# Patient Record
Sex: MALE | Race: WHITE | Employment: FULL TIME | URBAN - METROPOLITAN AREA
[De-identification: names, ages, dates, MRNs, and addresses within clinical notes are randomized per-mention and may not be internally consistent; named-entity substitution may affect disease eponyms.]

---

## 2019-06-16 ENCOUNTER — HOSPITAL ENCOUNTER (EMERGENCY)
Facility: CLINIC | Age: 54
Discharge: HOME | End: 2019-06-17
Attending: EMERGENCY MEDICINE | Admitting: EMERGENCY MEDICINE
Payer: COMMERCIAL

## 2019-06-16 DIAGNOSIS — R07.9 CHEST PAIN, UNSPECIFIED TYPE: ICD-10-CM

## 2019-06-16 DIAGNOSIS — R00.2 PALPITATIONS: ICD-10-CM

## 2019-06-16 DIAGNOSIS — R19.7 DIARRHEA, UNSPECIFIED TYPE: ICD-10-CM

## 2019-06-16 PROCEDURE — 93005 ELECTROCARDIOGRAM TRACING: CPT

## 2019-06-16 PROCEDURE — 96374 THER/PROPH/DIAG INJ IV PUSH: CPT

## 2019-06-16 PROCEDURE — 96361 HYDRATE IV INFUSION ADD-ON: CPT

## 2019-06-16 PROCEDURE — 99284 EMERGENCY DEPT VISIT MOD MDM: CPT | Mod: 25

## 2019-06-16 NOTE — ED AVS SNAPSHOT
[facility] Emergency Department  [address]  [facility] [address]  Phone:  [phone]  Fax:  [phone]                                    Ward Cline   MRN: [id number]    Department:  [facility] Emergency Department   Date of Visit:  6/16/2019           After Visit Summary Signature Page    I have received my discharge instructions, and my questions have been answered. I have discussed any challenges I see with this plan with the nurse or doctor.    ..........................................................................................................................................  Patient/Patient Representative Signature      ..........................................................................................................................................  Patient Representative Print Name and Relationship to Patient    ..................................................               ................................................  Date                                   Time    ..........................................................................................................................................  Reviewed by Signature/Title    ...................................................              ..............................................  Date                                               Time          22EPIC Rev 08/18

## 2019-06-17 VITALS
RESPIRATION RATE: 12 BRPM | HEART RATE: 87 BPM | DIASTOLIC BLOOD PRESSURE: 71 MMHG | TEMPERATURE: 98.2 F | OXYGEN SATURATION: 96 % | SYSTOLIC BLOOD PRESSURE: 126 MMHG

## 2019-06-17 LAB
ALBUMIN SERPL-MCNC: 4.1 G/DL (ref 3.4–5)
ALP SERPL-CCNC: 61 U/L (ref 40–150)
ALT SERPL W P-5'-P-CCNC: 34 U/L (ref 0–70)
ANION GAP SERPL CALCULATED.3IONS-SCNC: 8 MMOL/L (ref 3–14)
AST SERPL W P-5'-P-CCNC: 41 U/L (ref 0–45)
BILIRUB SERPL-MCNC: 1.6 MG/DL (ref 0.2–1.3)
BUN SERPL-MCNC: 18 MG/DL (ref 7–30)
CALCIUM SERPL-MCNC: 8.7 MG/DL (ref 8.5–10.1)
CHLORIDE SERPL-SCNC: 102 MMOL/L (ref 94–109)
CO2 SERPL-SCNC: 25 MMOL/L (ref 20–32)
CREAT SERPL-MCNC: 0.84 MG/DL (ref 0.66–1.25)
ERYTHROCYTE [DISTWIDTH] IN BLOOD BY AUTOMATED COUNT: 12.5 % (ref 10–15)
GFR SERPL CREATININE-BSD FRML MDRD: >90 ML/MIN/{1.73_M2}
GLUCOSE SERPL-MCNC: 109 MG/DL (ref 70–99)
HCT VFR BLD AUTO: 44.2 % (ref 40–53)
HGB BLD-MCNC: 14.9 G/DL (ref 13.3–17.7)
INTERPRETATION ECG - MUSE: NORMAL
LIPASE SERPL-CCNC: 125 U/L (ref 73–393)
MCH RBC QN AUTO: 30.7 PG (ref 26.5–33)
MCHC RBC AUTO-ENTMCNC: 33.7 G/DL (ref 31.5–36.5)
MCV RBC AUTO: 91 FL (ref 78–100)
PLATELET # BLD AUTO: 220 10E9/L (ref 150–450)
POTASSIUM SERPL-SCNC: 3.7 MMOL/L (ref 3.4–5.3)
PROT SERPL-MCNC: 7.4 G/DL (ref 6.8–8.8)
RBC # BLD AUTO: 4.85 10E12/L (ref 4.4–5.9)
SODIUM SERPL-SCNC: 135 MMOL/L (ref 133–144)
TROPONIN I SERPL-MCNC: <0.015 UG/L (ref 0–0.04)
WBC # BLD AUTO: 7.4 10E9/L (ref 4–11)

## 2019-06-17 PROCEDURE — 96374 THER/PROPH/DIAG INJ IV PUSH: CPT

## 2019-06-17 PROCEDURE — 25000125 ZZHC RX 250: Performed by: EMERGENCY MEDICINE

## 2019-06-17 PROCEDURE — 25000128 H RX IP 250 OP 636: Performed by: EMERGENCY MEDICINE

## 2019-06-17 PROCEDURE — 85027 COMPLETE CBC AUTOMATED: CPT | Performed by: EMERGENCY MEDICINE

## 2019-06-17 PROCEDURE — 84484 ASSAY OF TROPONIN QUANT: CPT | Performed by: EMERGENCY MEDICINE

## 2019-06-17 PROCEDURE — 80053 COMPREHEN METABOLIC PANEL: CPT | Performed by: EMERGENCY MEDICINE

## 2019-06-17 PROCEDURE — 83690 ASSAY OF LIPASE: CPT | Performed by: EMERGENCY MEDICINE

## 2019-06-17 PROCEDURE — 25000132 ZZH RX MED GY IP 250 OP 250 PS 637: Performed by: EMERGENCY MEDICINE

## 2019-06-17 RX ORDER — SODIUM CHLORIDE 9 MG/ML
1000 INJECTION, SOLUTION INTRAVENOUS CONTINUOUS
Status: DISCONTINUED | OUTPATIENT
Start: 2019-06-17 | End: 2019-06-17 | Stop reason: HOSPADM

## 2019-06-17 RX ORDER — ONDANSETRON 2 MG/ML
4 INJECTION INTRAMUSCULAR; INTRAVENOUS ONCE
Status: COMPLETED | OUTPATIENT
Start: 2019-06-17 | End: 2019-06-17

## 2019-06-17 RX ORDER — ONDANSETRON 4 MG/1
4 TABLET, ORALLY DISINTEGRATING ORAL EVERY 8 HOURS PRN
Qty: 10 TABLET | Refills: 0 | Status: SHIPPED | OUTPATIENT
Start: 2019-06-17 | End: 2019-06-20

## 2019-06-17 RX ADMIN — ONDANSETRON HYDROCHLORIDE 4 MG: 2 INJECTION, SOLUTION INTRAMUSCULAR; INTRAVENOUS at 00:42

## 2019-06-17 RX ADMIN — RANITIDINE 150 MG: 150 TABLET ORAL at 00:45

## 2019-06-17 RX ADMIN — SODIUM CHLORIDE 1000 ML: 9 INJECTION, SOLUTION INTRAVENOUS at 00:42

## 2019-06-17 RX ADMIN — LIDOCAINE HYDROCHLORIDE 30 ML: 20 SOLUTION ORAL; TOPICAL at 00:46

## 2019-06-17 ASSESSMENT — ENCOUNTER SYMPTOMS
CHILLS: 1
DIARRHEA: 1
PALPITATIONS: 1
ABDOMINAL PAIN: 1

## 2019-06-17 NOTE — DISCHARGE INSTRUCTIONS
Discharge Instructions  Chest Pain    You have been seen today for chest pain or discomfort.  At this time, your provider has found no signs that your chest pain is due to a serious or life-threatening condition, (or you have declined more testing and/or admission to the hospital). However, sometimes there is a serious problem that does not show up right away. Your evaluation today may not be complete and you may need further testing and evaluation.     Generally, every Emergency Department visit should have a follow-up clinic visit with either a primary or a specialty clinic/provider. Please follow-up as instructed by your emergency provider today.  Return to the Emergency Department if:  Your chest pain changes, gets worse, starts to happen more often, or comes with less activity.  You are newly short of breath.  You get very weak or tired.  You pass out or faint.  You have any new symptoms, like fever, cough, numb legs, or you cough up blood.  You have anything else that worries you.    Until you follow-up with your regular provider, please do the following:  If you have questions, contact your regular provider.  Follow-up with your regular provider/clinic as directed; this is very important.    If you were given a prescription for medicine here today, be sure to read all of the information (including the package insert) that comes with your prescription.  This will include important information about the medicine, its side effects, and any warnings that you need to know about.  The pharmacist who fills the prescription can provide more information and answer questions you may have about the medicine.  If you have questions or concerns that the pharmacist cannot address, please call or return to the Emergency Department.       Remember that you can always come back to the Emergency Department if you are not able to see your regular provider in the amount of time listed above, if you get any new symptoms, or if  there is anything that worries you.    Discharge Instructions  Abdominal Pain    Abdominal pain (belly pain) can be caused by many things. Your evaluation today does not show the exact cause for your pain. Your provider today has decided that it is unlikely your pain is due to a life threatening problem, or a problem requiring surgery or hospital admission. Sometimes those problems cannot be found right away, so it is very important that you follow up as directed.  Sometimes only the changes which occur over time allow the cause of your pain to be found.    Generally, every Emergency Department visit should have a follow-up clinic visit with either a primary or a specialty clinic/provider. Please follow-up as instructed by your emergency provider today. With abdominal pain, we often recommend very close follow-up, such as the following day.    ADULTS:  Return to the Emergency Department right away if:    You get an oral temperature above 102oF or as directed by your provider.  You have blood in your stools. This may be bright red or appear as black, tarry stools.    You keep vomiting (throwing up) or cannot drink liquids.  You see blood when you vomit.   You cannot have a bowel movement or you cannot pass gas.  Your stomach gets bloated or bigger.  Your skin or the whites of your eyes look yellow.  You faint.  You have bloody, frequent or painful urination (peeing).  You have new symptoms or anything that worries you.    CHILDREN:  Return to the Emergency Department right away if your child has any of the above-listed symptoms or the following:    Pushes your hand away or screams/cries when his/her belly is touched.  You notice your child is very fussy or weak.  Your child is very tired and is too tired to eat or drink.  Your child is dehydrated.  Signs of dehydration can be:  Significant change in the amount of wet diapers/urine.  Your infant or child starts to have dry mouth and lips, or no saliva (spit) or  tears.    PREGNANT WOMEN:  Return to the Emergency Department right away if you have any of the above-listed symptoms or the following:    You have bleeding, leaking fluid or passing tissue from the vagina.  You have worse pain or cramping, or pain in your shoulder or back.  You have vomiting that will not stop.  You have a temperature of 100oF or more.  Your baby is not moving as much as usual.  You faint.  You get a bad headache with or without eye problems and abdominal pain.  You have a seizure.  You have unusual discharge from your vagina and abdominal pain.    Abdominal pain is pretty common during pregnancy.  Your pain may or may not be related to your pregnancy. You should follow-up closely with your OB provider so they can evaluate you and your baby.  Until you follow-up with your regular provider, do the following:     Avoid sex and do not put anything in your vagina.  Drink clear fluids.  Only take medications approved by your provider.    MORE INFORMATION:    Appendicitis:  A possible cause of abdominal pain in any person who still has their appendix is acute appendicitis. Appendicitis is often hard to diagnose.  Testing does not always rule out early appendicitis or other causes of abdominal pain. Close follow-up with your provider and re-evaluations may be needed to figure out the reason for your abdominal pain.    Follow-up:  It is very important that you make an appointment with your clinic and go to the appointment.  If you do not follow-up with your primary provider, it may result in missing an important development which could result in permanent injury or disability and/or lasting pain.  If there is any problem keeping your appointment, call your provider or return to the Emergency Department.    Medications:  Take your medications as directed by your provider today.  Before using over-the-counter medications, ask your provider and make sure to take the medications as directed.  If you have any  "questions about medications, ask your provider.    Diet:  Resume your normal diet as much as possible, but do not eat fried, fatty or spicy foods while you have pain.  Do not drink alcohol or have caffeine.  Do not smoke tobacco.    Probiotics: If you have been given an antibiotic, you may want to also take a probiotic pill or eat yogurt with live cultures. Probiotics have \"good bacteria\" to help your intestines stay healthy. Studies have shown that probiotics help prevent diarrhea (loose stools) and other intestine problems (including C. diff infection) when you take antibiotics. You can buy these without a prescription in the pharmacy section of the store.     If you were given a prescription for medicine here today, be sure to read all of the information (including the package insert) that comes with your prescription.  This will include important information about the medicine, its side effects, and any warnings that you need to know about.  The pharmacist who fills the prescription can provide more information and answer questions you may have about the medicine.  If you have questions or concerns that the pharmacist cannot address, please call or return to the Emergency Department.       Remember that you can always come back to the Emergency Department if you are not able to see your regular provider in the amount of time listed above, if you get any new symptoms, or if there is anything that worries you.      Discharge Instructions  Adult Diarrhea    You have been seen today for diarrhea (loose stools). This is usually caused by a virus, but some bacteria, parasites, medicines, or other medical conditions can cause similar symptoms. At this time your provider does not find that your diarrhea is a sign of anything dangerous or life-threatening. However, sometimes the signs of serious illness do not show up right away. If you have new or worse symptoms, you may need to be seen again in the Emergency Department " or by your primary provider.     Generally, every Emergency Department visit should have a follow-up clinic visit with either a primary or a specialty clinic/provider. Please follow-up as instructed by your emergency provider today.    Return to the Emergency Department if:  You feel you are getting dehydrated, such as being very thirsty, not urinating (peeing) like usual, or feeling faint or lightheaded.   You develop a new fever.  You have abdominal (belly) pain that seems worse than cramps, is in one spot, or is getting worse over time.   You have blood in your stool or your stool becomes black.  (Remember that if you take Pepto-Bismol , this will turn your stool black).   You feel very weak.    What can I do to help myself?  The most important thing to do is to drink clear liquids.   It is best to have only small, frequent sips of liquids. Drinking too much at once may cause more diarrhea. You should also replace minerals, sodium and potassium lost with diarrhea. Pedialyte  and sports drinks can help you replace these minerals. You can also drink clear liquids such as water, weak tea, apple juice, and 7-Up . Avoid acidic liquids (orange juice), caffeine (coffee) or alcohol. Milk products will make the diarrhea worse.  Eat bland (plain) foods. Soda crackers, toast, plain noodles, gelatin, applesauce and bananas are good first choices. Avoid foods that have acid, are spicy, fatty or fibrous (such as meats, coarse grains, vegetables). You may start eating these foods again in about 3 days when you are better.   Sometimes treatment includes prescription medicine to prevent diarrhea. If your provider prescribes these for you, take them as directed.   Nonprescription medicine is available for the treatment of diarrhea and can be very effective. If you use it, make sure you use the dose recommended on the package. Check with your healthcare provider before you use any medicine for diarrhea.   Do not take ibuprofen, or  "other nonsteroidal anti-inflammatory medicines, without checking with your healthcare provider.   Probiotics: If you have been given an antibiotic, you may want to also take a probiotic pill or eat yogurt with live cultures. Probiotics have \"good bacteria\" to help your intestines stay healthy. Studies have shown that probiotics help prevent diarrhea and other intestine problems (including C. diff infection) when you take antibiotics. You can buy these without a prescription in the pharmacy section of the store.   If you were given a prescription for medicine here today, be sure to read all of the information (including the package insert) that comes with your prescription.  This will include important information about the medicine, its side effects, and any warnings that you need to know about.  The pharmacist who fills the prescription can provide more information and answer questions you may have about the medicine.  If you have questions or concerns that the pharmacist cannot address, please call or return to the Emergency Department.  Remember that you can always come back to the Emergency Department if you are not able to see your regular provider in the amount of time listed above, if you get any new symptoms, or if there is anything that worries you.      "

## 2019-06-17 NOTE — ED PROVIDER NOTES
"  History   Chief Complaint:  Palpitations     HPI   Ward Cline is an anticoagulated 53 year old male who presents with palpitations, diarrhea, nausea. The patient reports that at 1500 this afternoon he developed generalized abdominal pain, chills, and diarrhea he attributes to possible food poisoning. He has had four episodes of watery, non-bloody diarrhea. The patient's daughter also developed GI symptoms with nausea and vomiting. The patient believes they ate some bad guacamole. At 1900 this evening the patient reports developing an intermittent sensation in his chest he describes as being \"an abnormal rhythm and a Ca-Thud pain\" that lasts seconds at a time. No clear modifying factors. He was able to fall asleep about 1.5 hours ago but awoke to another episode of this pain, prompting him to come to the ED. The patient does note that he was cardioverted 10 days ago after he was found to be in a-fib. He did not have pain after the procedure and was able to shovel dirt yesterday without issues. The patient has no history of MI.    CARDIAC RISK FACTORS:  Sex:    Male  Tobacco:   No  Hypertension:   No  Hyperlipidemia:  No  Diabetes:   No  Family History:  No    Allergies:  No known drug allergies    Medications:    Eliquis  Sotalol  Metoprolol succinate     Past Medical History:    Atrial fibrillation  Migraine    Past Surgical History:    Appendectomy    Family History:    History reviewed. No pertinent family history.     Social History:  Smoking status: Does not smoker  Alcohol use: Yes  Marital Status:   [2]    Review of Systems   Constitutional: Positive for chills.   Cardiovascular: Positive for chest pain and palpitations.   Gastrointestinal: Positive for abdominal pain (generalized) and diarrhea.   All other systems reviewed and are negative.      Physical Exam   Patient Vitals for the past 24 hrs:   BP Temp Temp src Pulse Heart Rate Resp SpO2   06/17/19 0100 115/69 -- -- 77 82 -- 94 %   06/17/19 " 0045 114/64 -- -- 81 80 12 99 %   06/17/19 0030 121/73 -- -- 77 77 19 98 %   06/17/19 0015 119/75 -- -- 79 77 13 99 %   06/17/19 0000 127/75 -- -- -- -- -- 100 %   06/16/19 2356 127/75 98.2  F (36.8  C) Oral 88 -- 20 100 %     Physical Exam  VS: Reviewed per above  HENT: Mucous membranes dry  EYES: sclera anicteric  CV: Rate as noted, regular rhythm.   RESP: Effort normal. Breath sounds are normal bilaterally.  GI: no rebound or guarding, mild epigastric tenderness, not distended.  NEURO: Alert, moving all extremities  MSK: No deformity of the extremities  SKIN: Warm and dry      Emergency Department Course   ECG (00:03:40):  Rate 81 bpm. NV interval 154. QRS duration 102. QT/QTc 374/434. P-R-T axes 71 12 52. Normal sinus rhythm. Normal ECG. Interpreted at 0030 by Abdoulaye Cerna MD.    Laboratory:  CBC: WNL (WBC 7.4, HGB 14.9, )  CMP: Glucose 109 (H) o/w WNL (Creatinine 0.84)  Lipase: 125  Troponin: <0.015    Interventions:  0042: NS 1L IV Bolus            Zofran 4 mg IV  0045: Zantac 150 mg PO  0046: GI Cocktail - Maalox 15 mL, Viscous Lidocaine 15 mL, 30 mL suspension PO    Emergency Department Course:  Past medical records, nursing notes, and vitals reviewed.   0019: I performed an exam of the patient and obtained history, as documented above.  IV inserted and blood drawn.  EKG obtained, results above.    1335: I rechecked the patient. Explained findings to the patient.      Findings and plan explained to the patient. Patient discharged home with instructions regarding supportive care, medications, and reasons to return. The importance of close follow-up was reviewed.     Impression & Plan    Medical Decision Making:  Pt presents to ER with palpitations, diarrhea, nausea and chest pain. Initial VS wnl. Exam without peritoneal abdominal signs. ECG sinus without signs ischemia. Troponin negative. Hx not c/w angina type pain and few risk factors. Low suspicion for ACS.  No e/o  hepatitis/pancreatitis/obstructive LFT pattern. No fever or blood in stool to suggest invasive bacterial diarrhea. Sx improved with antacids, antiemetics, IVF. On reassessment no CP or abdominal pain. Discharged with zofran and recommend recheck in clinic. Close return precautions discussed.     Diagnosis:    ICD-10-CM    1. Palpitations R00.2    2. Chest pain, unspecified type R07.9    3. Diarrhea, unspecified type R19.7        Disposition:  Discharged to home.    Discharge Medications:     Started    ondansetron 4 MG ODT tab  Commonly known as:  ZOFRAN ODT  4 mg, Oral, EVERY 8 HOURS PRN              Jose Rios  6/16/2019   [facility] EMERGENCY DEPARTMENT  I, Jose Rios, am serving as a scribe at 12:19 AM on 6/17/2019 to document services personally performed by Abdoulaye Cerna MD based on my observations and the provider's statements to me.        Abdoulaye Cerna MD  06/17/19 [id number]

## 2020-12-29 ENCOUNTER — HOSPITAL ENCOUNTER (OUTPATIENT)
Dept: LAB | Facility: CLINIC | Age: 55
Discharge: HOME | End: 2020-12-29
Attending: FAMILY MEDICINE | Admitting: FAMILY MEDICINE
Payer: COMMERCIAL

## 2020-12-29 DIAGNOSIS — I48.91 ATRIAL FIBRILLATION (H): ICD-10-CM

## 2020-12-29 DIAGNOSIS — E63.9 NUTRITIONAL DISORDER: Primary | ICD-10-CM

## 2020-12-29 DIAGNOSIS — F41.9 ANXIETY: ICD-10-CM

## 2020-12-29 DIAGNOSIS — R53.83 FATIGUE: ICD-10-CM

## 2020-12-29 DIAGNOSIS — J30.2 SEASONAL ALLERGIC RHINITIS: ICD-10-CM

## 2020-12-29 DIAGNOSIS — F43.9 STRESS: ICD-10-CM

## 2020-12-29 DIAGNOSIS — G47.9 SLEEP TROUBLE: ICD-10-CM

## 2020-12-29 LAB
ALBUMIN SERPL-MCNC: 3.9 G/DL (ref 3.4–5)
ALP SERPL-CCNC: 62 U/L (ref 40–150)
ALT SERPL W P-5'-P-CCNC: 41 U/L (ref 0–70)
ANION GAP SERPL CALCULATED.3IONS-SCNC: 2 MMOL/L (ref 3–14)
AST SERPL W P-5'-P-CCNC: 34 U/L (ref 0–45)
BASOPHILS # BLD AUTO: 0 10E9/L (ref 0–0.2)
BASOPHILS NFR BLD AUTO: 0.5 %
BILIRUB SERPL-MCNC: 1.1 MG/DL (ref 0.2–1.3)
BUN SERPL-MCNC: 13 MG/DL (ref 7–30)
CALCIUM SERPL-MCNC: 9.3 MG/DL (ref 8.5–10.1)
CHLORIDE SERPL-SCNC: 105 MMOL/L (ref 94–109)
CO2 SERPL-SCNC: 33 MMOL/L (ref 20–32)
CREAT SERPL-MCNC: 0.84 MG/DL (ref 0.66–1.25)
DIFFERENTIAL METHOD BLD: ABNORMAL
EOSINOPHIL # BLD AUTO: 0.1 10E9/L (ref 0–0.7)
EOSINOPHIL NFR BLD AUTO: 2.3 %
ERYTHROCYTE [DISTWIDTH] IN BLOOD BY AUTOMATED COUNT: 12.1 % (ref 10–15)
FERRITIN SERPL-MCNC: 138 NG/ML (ref 26–388)
GFR SERPL CREATININE-BSD FRML MDRD: >90 ML/MIN/{1.73_M2}
GLUCOSE SERPL-MCNC: 108 MG/DL (ref 70–99)
HCT VFR BLD AUTO: 45.3 % (ref 40–53)
HGB BLD-MCNC: 15 G/DL (ref 13.3–17.7)
IMM GRANULOCYTES # BLD: 0 10E9/L (ref 0–0.4)
IMM GRANULOCYTES NFR BLD: 0.5 %
LYMPHOCYTES # BLD AUTO: 0.7 10E9/L (ref 0.8–5.3)
LYMPHOCYTES NFR BLD AUTO: 19.1 %
MCH RBC QN AUTO: 31.1 PG (ref 26.5–33)
MCHC RBC AUTO-ENTMCNC: 33.1 G/DL (ref 31.5–36.5)
MCV RBC AUTO: 94 FL (ref 78–100)
MONOCYTES # BLD AUTO: 0.2 10E9/L (ref 0–1.3)
MONOCYTES NFR BLD AUTO: 6.3 %
NEUTROPHILS # BLD AUTO: 2.7 10E9/L (ref 1.6–8.3)
NEUTROPHILS NFR BLD AUTO: 71.3 %
NRBC # BLD AUTO: 0 10*3/UL
NRBC BLD AUTO-RTO: 0 /100
PLATELET # BLD AUTO: 213 10E9/L (ref 150–450)
POTASSIUM SERPL-SCNC: 5.1 MMOL/L (ref 3.4–5.3)
PROT SERPL-MCNC: 7.5 G/DL (ref 6.8–8.8)
RBC # BLD AUTO: 4.83 10E12/L (ref 4.4–5.9)
SODIUM SERPL-SCNC: 140 MMOL/L (ref 133–144)
T3FREE SERPL-MCNC: 2.9 PG/ML (ref 2.3–4.2)
T4 FREE SERPL-MCNC: 1.03 NG/DL (ref 0.76–1.46)
TSH SERPL DL<=0.005 MIU/L-ACNC: 1.1 MU/L (ref 0.4–4)
WBC # BLD AUTO: 3.8 10E9/L (ref 4–11)

## 2020-12-29 PROCEDURE — 82390 ASSAY OF CERULOPLASMIN: CPT | Performed by: FAMILY MEDICINE

## 2020-12-29 PROCEDURE — 84311 SPECTROPHOTOMETRY: CPT | Performed by: FAMILY MEDICINE

## 2020-12-29 PROCEDURE — 84481 FREE ASSAY (FT-3): CPT | Performed by: FAMILY MEDICINE

## 2020-12-29 PROCEDURE — 85025 COMPLETE CBC W/AUTO DIFF WBC: CPT | Performed by: FAMILY MEDICINE

## 2020-12-29 PROCEDURE — 82728 ASSAY OF FERRITIN: CPT | Performed by: FAMILY MEDICINE

## 2020-12-29 PROCEDURE — 80053 COMPREHEN METABOLIC PANEL: CPT | Performed by: FAMILY MEDICINE

## 2020-12-29 PROCEDURE — 84443 ASSAY THYROID STIM HORMONE: CPT | Performed by: FAMILY MEDICINE

## 2020-12-29 PROCEDURE — 82525 ASSAY OF COPPER: CPT | Performed by: FAMILY MEDICINE

## 2020-12-29 PROCEDURE — 83088 ASSAY OF HISTAMINE: CPT | Performed by: FAMILY MEDICINE

## 2020-12-29 PROCEDURE — 84482 T3 REVERSE: CPT | Performed by: FAMILY MEDICINE

## 2020-12-29 PROCEDURE — 84630 ASSAY OF ZINC: CPT | Performed by: FAMILY MEDICINE

## 2020-12-29 PROCEDURE — 84439 ASSAY OF FREE THYROXINE: CPT | Performed by: FAMILY MEDICINE

## 2020-12-29 PROCEDURE — 36415 COLL VENOUS BLD VENIPUNCTURE: CPT | Performed by: FAMILY MEDICINE

## 2020-12-30 LAB — HISTAMINE BLD-SCNC: 483 NMOL/L (ref 180–1800)

## 2020-12-31 LAB
CERULOPLASMIN SERPL-MCNC: 22 MG/DL (ref 20–60)
T3REVERSE SERPL-MCNC: 14.8 NG/DL (ref 9–27)

## 2021-01-02 LAB
COPPER SERPL-MCNC: 90.3 UG/DL (ref 70–140)
ZINC SERPL-MCNC: 78.5 UG/DL (ref 60–120)

## 2021-01-14 LAB — LAB SCANNED RESULT: NORMAL

## 2021-01-15 ENCOUNTER — HEALTH MAINTENANCE LETTER (OUTPATIENT)
Age: 56
End: 2021-01-15

## 2021-10-24 ENCOUNTER — HEALTH MAINTENANCE LETTER (OUTPATIENT)
Age: 56
End: 2021-10-24

## 2021-10-29 ENCOUNTER — MEDICAL CORRESPONDENCE (OUTPATIENT)
Dept: HEALTH INFORMATION MANAGEMENT | Facility: CLINIC | Age: 56
End: 2021-10-29
Payer: COMMERCIAL

## 2021-11-11 ENCOUNTER — LAB (OUTPATIENT)
Dept: LAB | Facility: CLINIC | Age: 56
End: 2021-11-11
Payer: COMMERCIAL

## 2021-11-11 DIAGNOSIS — U07.1: Primary | ICD-10-CM

## 2021-11-11 LAB
ALBUMIN SERPL-MCNC: 3.6 G/DL (ref 3.4–5)
ALP SERPL-CCNC: 62 U/L (ref 40–150)
ALT SERPL W P-5'-P-CCNC: 34 U/L (ref 0–70)
ANION GAP SERPL CALCULATED.3IONS-SCNC: 4 MMOL/L (ref 3–14)
APTT PPP: 34 SECONDS (ref 22–38)
AST SERPL W P-5'-P-CCNC: 26 U/L (ref 0–45)
BASOPHILS # BLD AUTO: 0 10E3/UL (ref 0–0.2)
BASOPHILS NFR BLD AUTO: 0 %
BILIRUB SERPL-MCNC: 0.6 MG/DL (ref 0.2–1.3)
BUN SERPL-MCNC: 12 MG/DL (ref 7–30)
CALCIUM SERPL-MCNC: 8.6 MG/DL (ref 8.5–10.1)
CHLORIDE BLD-SCNC: 106 MMOL/L (ref 94–109)
CO2 SERPL-SCNC: 29 MMOL/L (ref 20–32)
CREAT SERPL-MCNC: 0.82 MG/DL (ref 0.66–1.25)
CRP SERPL HS-MCNC: 1.3 MG/L
D DIMER PPP FEU-MCNC: 1.51 UG/ML FEU (ref 0–0.5)
EOSINOPHIL # BLD AUTO: 0 10E3/UL (ref 0–0.7)
EOSINOPHIL NFR BLD AUTO: 1 %
ERYTHROCYTE [DISTWIDTH] IN BLOOD BY AUTOMATED COUNT: 12.1 % (ref 10–15)
ERYTHROCYTE [SEDIMENTATION RATE] IN BLOOD BY WESTERGREN METHOD: 9 MM/HR (ref 0–20)
GFR SERPL CREATININE-BSD FRML MDRD: >90 ML/MIN/1.73M2
GLUCOSE BLD-MCNC: 104 MG/DL (ref 70–99)
HCT VFR BLD AUTO: 43.9 % (ref 40–53)
HGB BLD-MCNC: 14.1 G/DL (ref 13.3–17.7)
IMM GRANULOCYTES # BLD: 0 10E3/UL
IMM GRANULOCYTES NFR BLD: 0 %
INR PPP: 1.04 (ref 0.85–1.15)
LYMPHOCYTES # BLD AUTO: 0.6 10E3/UL (ref 0.8–5.3)
LYMPHOCYTES NFR BLD AUTO: 16 %
MCH RBC QN AUTO: 30.3 PG (ref 26.5–33)
MCHC RBC AUTO-ENTMCNC: 32.1 G/DL (ref 31.5–36.5)
MCV RBC AUTO: 94 FL (ref 78–100)
MONOCYTES # BLD AUTO: 0.3 10E3/UL (ref 0–1.3)
MONOCYTES NFR BLD AUTO: 8 %
NEUTROPHILS # BLD AUTO: 2.8 10E3/UL (ref 1.6–8.3)
NEUTROPHILS NFR BLD AUTO: 75 %
NRBC # BLD AUTO: 0 10E3/UL
NRBC BLD AUTO-RTO: 0 /100
PLATELET # BLD AUTO: 247 10E3/UL (ref 150–450)
POTASSIUM BLD-SCNC: 4.8 MMOL/L (ref 3.4–5.3)
PROT SERPL-MCNC: 7.1 G/DL (ref 6.8–8.8)
RBC # BLD AUTO: 4.65 10E6/UL (ref 4.4–5.9)
SODIUM SERPL-SCNC: 139 MMOL/L (ref 133–144)
WBC # BLD AUTO: 3.7 10E3/UL (ref 4–11)

## 2021-11-11 PROCEDURE — 83090 ASSAY OF HOMOCYSTEINE: CPT

## 2021-11-11 PROCEDURE — 82525 ASSAY OF COPPER: CPT

## 2021-11-11 PROCEDURE — 84630 ASSAY OF ZINC: CPT

## 2021-11-11 PROCEDURE — 85025 COMPLETE CBC W/AUTO DIFF WBC: CPT

## 2021-11-11 PROCEDURE — 86141 C-REACTIVE PROTEIN HS: CPT

## 2021-11-11 PROCEDURE — 85379 FIBRIN DEGRADATION QUANT: CPT

## 2021-11-11 PROCEDURE — 85610 PROTHROMBIN TIME: CPT

## 2021-11-11 PROCEDURE — 80053 COMPREHEN METABOLIC PANEL: CPT

## 2021-11-11 PROCEDURE — 36415 COLL VENOUS BLD VENIPUNCTURE: CPT

## 2021-11-11 PROCEDURE — 85652 RBC SED RATE AUTOMATED: CPT

## 2021-11-11 PROCEDURE — 85730 THROMBOPLASTIN TIME PARTIAL: CPT

## 2021-11-11 PROCEDURE — 82306 VITAMIN D 25 HYDROXY: CPT

## 2021-11-15 LAB
COPPER SERPL-MCNC: 102.9 UG/DL
DEPRECATED CALCIDIOL+CALCIFEROL SERPL-MC: <50 UG/L (ref 20–75)
VITAMIN D2 SERPL-MCNC: <5 UG/L
VITAMIN D3 SERPL-MCNC: 45 UG/L
ZINC SERPL-MCNC: 86.5 UG/DL

## 2021-11-17 LAB — HCYS SERPL-SCNC: 9.5 UMOL/L (ref 4–12)

## 2022-02-13 ENCOUNTER — HEALTH MAINTENANCE LETTER (OUTPATIENT)
Age: 57
End: 2022-02-13

## 2022-10-15 ENCOUNTER — HEALTH MAINTENANCE LETTER (OUTPATIENT)
Age: 57
End: 2022-10-15

## 2023-03-26 ENCOUNTER — HEALTH MAINTENANCE LETTER (OUTPATIENT)
Age: 58
End: 2023-03-26